# Patient Record
Sex: FEMALE | Race: WHITE | NOT HISPANIC OR LATINO | URBAN - METROPOLITAN AREA
[De-identification: names, ages, dates, MRNs, and addresses within clinical notes are randomized per-mention and may not be internally consistent; named-entity substitution may affect disease eponyms.]

---

## 2019-11-25 ENCOUNTER — INPATIENT (INPATIENT)
Facility: HOSPITAL | Age: 19
LOS: 0 days | Discharge: HOME | End: 2019-11-25
Attending: PEDIATRICS | Admitting: PEDIATRICS
Payer: COMMERCIAL

## 2019-11-25 ENCOUNTER — TRANSCRIPTION ENCOUNTER (OUTPATIENT)
Age: 19
End: 2019-11-25

## 2019-11-25 VITALS
OXYGEN SATURATION: 98 % | SYSTOLIC BLOOD PRESSURE: 133 MMHG | TEMPERATURE: 99 F | RESPIRATION RATE: 19 BRPM | HEART RATE: 113 BPM | WEIGHT: 216.05 LBS | DIASTOLIC BLOOD PRESSURE: 77 MMHG

## 2019-11-25 VITALS
RESPIRATION RATE: 20 BRPM | TEMPERATURE: 97 F | SYSTOLIC BLOOD PRESSURE: 116 MMHG | HEART RATE: 119 BPM | OXYGEN SATURATION: 99 % | DIASTOLIC BLOOD PRESSURE: 69 MMHG

## 2019-11-25 DIAGNOSIS — Z98.890 OTHER SPECIFIED POSTPROCEDURAL STATES: Chronic | ICD-10-CM

## 2019-11-25 LAB
ALBUMIN SERPL ELPH-MCNC: 4.1 G/DL — SIGNIFICANT CHANGE UP (ref 3.5–5.2)
ALP SERPL-CCNC: 78 U/L — SIGNIFICANT CHANGE UP (ref 30–115)
ALT FLD-CCNC: 10 U/L — LOW (ref 14–37)
ANION GAP SERPL CALC-SCNC: 17 MMOL/L — HIGH (ref 7–14)
AST SERPL-CCNC: 12 U/L — LOW (ref 14–37)
BILIRUB SERPL-MCNC: 0.3 MG/DL — SIGNIFICANT CHANGE UP (ref 0.2–1.2)
BUN SERPL-MCNC: 10 MG/DL — SIGNIFICANT CHANGE UP (ref 10–20)
CALCIUM SERPL-MCNC: 9.1 MG/DL — SIGNIFICANT CHANGE UP (ref 8.5–10.1)
CHLORIDE SERPL-SCNC: 99 MMOL/L — SIGNIFICANT CHANGE UP (ref 98–110)
CO2 SERPL-SCNC: 23 MMOL/L — SIGNIFICANT CHANGE UP (ref 17–32)
CREAT SERPL-MCNC: 0.6 MG/DL — LOW (ref 0.7–1.5)
GLUCOSE SERPL-MCNC: 101 MG/DL — HIGH (ref 70–99)
HCT VFR BLD CALC: 38 % — SIGNIFICANT CHANGE UP (ref 37–47)
HGB BLD-MCNC: 11.7 G/DL — LOW (ref 12–16)
MCHC RBC-ENTMCNC: 26.1 PG — LOW (ref 27–31)
MCHC RBC-ENTMCNC: 30.8 G/DL — LOW (ref 32–37)
MCV RBC AUTO: 84.8 FL — SIGNIFICANT CHANGE UP (ref 81–99)
NRBC # BLD: 0 /100 WBCS — SIGNIFICANT CHANGE UP (ref 0–0)
PLATELET # BLD AUTO: 323 K/UL — SIGNIFICANT CHANGE UP (ref 130–400)
POTASSIUM SERPL-MCNC: 4.2 MMOL/L — SIGNIFICANT CHANGE UP (ref 3.5–5)
POTASSIUM SERPL-SCNC: 4.2 MMOL/L — SIGNIFICANT CHANGE UP (ref 3.5–5)
PROT SERPL-MCNC: 7.9 G/DL — SIGNIFICANT CHANGE UP (ref 6–8)
RBC # BLD: 4.48 M/UL — SIGNIFICANT CHANGE UP (ref 4.2–5.4)
RBC # FLD: 17.9 % — HIGH (ref 11.5–14.5)
SODIUM SERPL-SCNC: 139 MMOL/L — SIGNIFICANT CHANGE UP (ref 135–146)
WBC # BLD: 17.52 K/UL — HIGH (ref 4.8–10.8)
WBC # FLD AUTO: 17.52 K/UL — HIGH (ref 4.8–10.8)

## 2019-11-25 PROCEDURE — 99285 EMERGENCY DEPT VISIT HI MDM: CPT

## 2019-11-25 PROCEDURE — 99222 1ST HOSP IP/OBS MODERATE 55: CPT

## 2019-11-25 RX ORDER — CEFDINIR 250 MG/5ML
300 POWDER, FOR SUSPENSION ORAL
Refills: 0 | Status: DISCONTINUED | OUTPATIENT
Start: 2019-11-25 | End: 2019-11-25

## 2019-11-25 RX ORDER — DIPHENHYDRAMINE HCL 50 MG
25 CAPSULE ORAL EVERY 4 HOURS
Refills: 0 | Status: DISCONTINUED | OUTPATIENT
Start: 2019-11-25 | End: 2019-11-25

## 2019-11-25 RX ORDER — CEFDINIR 250 MG/5ML
6 POWDER, FOR SUSPENSION ORAL
Qty: 120 | Refills: 0
Start: 2019-11-25 | End: 2019-12-04

## 2019-11-25 RX ORDER — DIPHENHYDRAMINE HCL 50 MG
50 CAPSULE ORAL ONCE
Refills: 0 | Status: COMPLETED | OUTPATIENT
Start: 2019-11-25 | End: 2019-11-25

## 2019-11-25 RX ORDER — CEFTRIAXONE 500 MG/1
2000 INJECTION, POWDER, FOR SOLUTION INTRAMUSCULAR; INTRAVENOUS EVERY 24 HOURS
Refills: 0 | Status: DISCONTINUED | OUTPATIENT
Start: 2019-11-25 | End: 2019-11-25

## 2019-11-25 RX ORDER — DIPHENHYDRAMINE HCL 50 MG
20 CAPSULE ORAL
Qty: 0 | Refills: 0 | DISCHARGE
Start: 2019-11-25

## 2019-11-25 RX ORDER — CEFDINIR 250 MG/5ML
1 POWDER, FOR SUSPENSION ORAL
Qty: 20 | Refills: 0
Start: 2019-11-25 | End: 2019-12-04

## 2019-11-25 RX ORDER — SODIUM CHLORIDE 9 MG/ML
1000 INJECTION, SOLUTION INTRAVENOUS
Refills: 0 | Status: DISCONTINUED | OUTPATIENT
Start: 2019-11-25 | End: 2019-11-25

## 2019-11-25 RX ORDER — DIPHENHYDRAMINE HCL 50 MG
50 CAPSULE ORAL EVERY 6 HOURS
Refills: 0 | Status: DISCONTINUED | OUTPATIENT
Start: 2019-11-25 | End: 2019-11-25

## 2019-11-25 RX ADMIN — CEFDINIR 300 MILLIGRAM(S): 250 POWDER, FOR SUSPENSION ORAL at 19:13

## 2019-11-25 RX ADMIN — Medication 100 MILLIGRAM(S): at 05:42

## 2019-11-25 RX ADMIN — Medication 30 MILLIGRAM(S): at 04:45

## 2019-11-25 RX ADMIN — SODIUM CHLORIDE 100 MILLILITER(S): 9 INJECTION, SOLUTION INTRAVENOUS at 05:42

## 2019-11-25 RX ADMIN — Medication 25 MILLIGRAM(S): at 09:41

## 2019-11-25 RX ADMIN — Medication 50 MILLIGRAM(S): at 15:22

## 2019-11-25 RX ADMIN — Medication 450 MILLIGRAM(S): at 18:04

## 2019-11-25 NOTE — ED PROVIDER NOTE - NS ED ROS FT
Constitutional:  see HPI  Head:  no change in behavior or LOC  Eyes:  no eye redness, or discharge  ENMT:  no mouth or throat sores or lesions, not tugging at ears  Cardiac: no cyanosis  Respiratory: no cough, wheezing, or trouble breathing  GI: no vomiting or diarrhea or stool color change  :  no change in urine output  MS: no joint swelling or redness  Neuro:  no seizure, no change in movements of arms and legs  Skin:  see hpi

## 2019-11-25 NOTE — ED PEDIATRIC TRIAGE NOTE - CHIEF COMPLAINT QUOTE
c/o redness and swelling to right eye x 2 day. As per mom, pt has some redness under right eye and started on clindamycin and erythromycin by pediatrician. Today is Day 10 of ABT. denies fevers.

## 2019-11-25 NOTE — CONSULT NOTE ADULT - ASSESSMENT
1. Patient with RUL/RLL edema and erythema, appears c/w preseptal cellulitis   - VA reduced OD/OS (pt given IV benadryl prior to exam), pupils normal, motilities intact, optic nerve head distinct OU, pt afebrile   - Pt recently completed course of PO Clindamycin, IV Clindamycin attempted in ED however IV infiltrated and did not receive full dose, appears to be improving per father   - Recommend course of oral antibiotics, would recommend antibiotic with more gram positive coverage vs clindamycin (eg Bactrim)  - We will follow-up next week       Thank you for the opportunity to consult. Please re-consult as necessary.

## 2019-11-25 NOTE — H&P PEDIATRIC - NSHPLABSRESULTS_GEN_ALL_CORE
CBC Full  -  ( 25 Nov 2019 04:00 )  WBC Count : 17.52 K/uL  RBC Count : 4.48 M/uL  Hemoglobin : 11.7 g/dL  Hematocrit : 38.0 %  Platelet Count - Automated : 323 K/uL  Mean Cell Volume : 84.8 fL  Mean Cell Hemoglobin : 26.1 pg  Mean Cell Hemoglobin Concentration : 30.8 g/dL  Auto Neutrophil # : x  Auto Lymphocyte # : x  Auto Monocyte # : x  Auto Eosinophil # : x  Auto Basophil # : x  Auto Neutrophil % : x  Auto Lymphocyte % : x  Auto Monocyte % : x  Auto Eosinophil % : x  Auto Basophil % : x    CMP pending

## 2019-11-25 NOTE — CONSULT NOTE ADULT - SUBJECTIVE AND OBJECTIVE BOX
ENA CRAFT  [id number]  19y Female      Patient is a 19y old  Female who presents with a chief complaint of Cellulitis (2019 16:02). Ophthalmology consulted for right eye swelling and erythema despite outpatient antiobiotics. Father reports stye which started 10 days ago, was given PO Clindamycin by pediatrician Dr. Hodges with improvement in stye. Last night, erythema and edema recurred, worse than initial stye. Patient administered IV clindamycin but IV infiltrated prior to her receiving full dose. Father reports improvement in appearance of lids since this AM. Patient denies diplopia, reports pain only on palpation. Patient afebrile.    HEALTH ISSUES - R/O PROBLEM Dx:    HPI:  19yF with history of trisomy 21 and hidradenitis suppurativa presents with right eye swelling despite outpatient antibiotics admitted for IV antibiotics for cellulitis with failed outpatient treatment.     Symptoms began as a stye of the right eye, and PMD prescribed clindamycin and erythromycin topical, which resolved symptoms. Clindamycin finished the day prior to admission, and after completion of antibiotics, parents noted increasing swelling of the right eyelid and increasing erythema of the surrounding area. Parents state that child is constantly scratching at the area and believe this is worsening her symptoms. This has happened once before to the right eye, necessitating IM injections outpatient at PMD office. There is mild diarrhea from the antibiotics, however no fever, vomiting, nausea, change in PO or UOP.       PMD: Carroll  PMH: Trisomy 21, hidradenitis supporativa  PSH: TNA  Allergies: amoxicillin- rash  Medications: topical mupirocin PRN for hidradenitis   FMH: none  Birth: FT, , no NICU  Development: receives speech, PT, OT at day program, developmentally delayed  Immunizations: UTD no flu  Social: lives at home with parents. 1 dog, no smokers    ED course: CBC, CMP, IV placed and given 1x benadryl (2019 04:12)      Extended HPI:  (-)burning, itching, redness, tearing OD/OS  (-)flashes, floaters, eye pain OD/OS    PAST MEDICAL & SURGICAL HISTORY:  Hidradenitis suppurativa  Down syndrome  History of tonsillectomy and adenoidectomy    MEDICATIONS  (STANDING):  cefTRIAXone IV Intermittent - Peds 2000 milliGRAM(s) IV Intermittent every 24 hours  clindamycin IV Intermittent - Peds 900 milliGRAM(s) IV Intermittent every 8 hours  dextrose 5% + sodium chloride 0.9%. - Pediatric 1000 milliLiter(s) (100 mL/Hr) IV Continuous <Continuous>    MEDICATIONS  (PRN):  diphenhydrAMINE   Oral Liquid - Peds 50 milliGRAM(s) Oral every 6 hours PRN Itching    Allergies    amoxicillin (Rash)    Intolerances        POHx:  CARINA: 2019   (-)injuries/surgeries    Ocular Medications: none     FOHx: Non-contributory    VISUAL ACUITY (poor cooperation, pt given IV Benadryl prior to exam)  OD: 20/70 sc PHNI   OS 20/80 sc PHNI     NEURO TESTING  Pupils: 	PERRL, (-) APD OD/OS  EOMS: 	FULL OD/OS  CVF: 	Full to red light OD/OS    ANTERIOR SEGMENT EVALUATION:   lids/lashes:  OD: moderate periorbital edema and erythema; OS: clear   conjunctiva: 	clear OD/OS  cornea: 	clear OD/OS  anterior chamber: deep & quiet OD/OS  iris: 	flat and even OD/OS    INTRAOCULAR PRESSURE  Digital: soft and equal OU   @ 11:30am     POSTERIOR SEGMENT EVALUATION  Dilated: Tropicamide 1%, fleeting views, poor cooperation   Lens: 		clear OD/OS  Vitreous: 	clear OD/OS  Optic nerve:	distinct, pink and healthy OD/OS: ONH drusen OD/OS  Macula: 	flat, even OD/OS  Vessels: 	2:3 OD/OS  Periphery: 	flat, (-)holes/breaks/tears 360 OD/OS to extent seen

## 2019-11-25 NOTE — H&P PEDIATRIC - ATTENDING COMMENTS
19 year old trisomy 21 admitted with right side eye swelling, erythema and pain secondary to preseptal (more likely) versus orbital cellulitis. Mom states that it started out as small stye and was placed on oral clindamycin and once she finished course swelling came back worse than before. Physical exam shows normal vitals, afebrile. HEENT right periorbital swelling with erythema, no cutaneous lesions visible. EOM intact and no pain on movement B/L, no ptosis. Facial features consistent with trisomy 21. Otherwise unremarkable exam. Will continue with antibiotics, ophtho exam, orbital CT if indicated and continue clinical monitoring.

## 2019-11-25 NOTE — H&P PEDIATRIC - ASSESSMENT
Emily is a 19yF with history of trisomy 21 and hidradenitis who presents with worsened eye erythema and swelling despite outpatient antibiotics, admitted for IV management of cellulitis and ophthalmology consult    Plan  Resp  -room air    FENGI  -regular diet  -D5NS @100cc/hr    ID  -Clindamycin 600mg q8  -s/p 1x benadryl to help with scratching of the area, will assess effectiveness and order if needed    Ophtho  opthalmology consult  -consider CT orbit to assess extent of cellulitis     Derm  -father to bring in topical cream for hidradenitis, will order once brings in Emily is a 19yF with history of trisomy 21 and hidradenitis who presents with worsened eye erythema and swelling despite outpatient antibiotics, admitted for IV management of cellulitis and ophthalmology consult    Plan  Resp  -room air    FENGI  -regular diet  -D5NS @100cc/hr    ID  -Clindamycin 600mg q8  -s/p 1x benadryl to help with scratching of the area, will assess effectiveness and order if needed  -warm compresses    Ophtho  -opthalmology consult  -consider CT orbit to assess extent of cellulitis     Derm  -father to bring in topical cream for hidradenitis, will order once brings in Emily is a 19yF with history of trisomy 21 and hidradenitis who presents with worsened eye erythema and swelling despite outpatient antibiotics, admitted for IV management of cellulitis and ophthalmology consult    Plan  Resp  -room air    FENGI  -regular diet  -D5NS @100cc/hr    ID  -Clindamycin 600mg q8  -Ceftriaxone   -s/p 1x benadryl to help with scratching of the area, will assess effectiveness and order if needed  -warm compresses    Ophtho  -opthalmology consult  -consider CT orbit to assess extent of cellulitis if vision is impaired as per ophtho, or no improvement with IV antibiotics     Derm  -father to bring in topical cream for hidradenitis, will order once brings in Emily is a 19yF with history of trisomy 21 and hidradenitis who presents with worsened eye erythema and swelling despite outpatient antibiotics, admitted for IV management of cellulitis and ophthalmology consult    Plan  Resp  -room air    FENGI  -regular diet  -D5NS @100cc/hr    ID  -Clindamycin 600mg q8  -Ceftriaxone 2g q24  -s/p 1x benadryl to help with scratching of the area, will assess effectiveness and order if needed  -warm compresses    Ophtho  -opthalmology consult  -consider CT orbit to assess extent of cellulitis if vision is impaired as per ophtho, or no improvement with IV antibiotics     Derm  -father to bring in topical cream for hidradenitis, will order once brings in

## 2019-11-25 NOTE — DISCHARGE NOTE PROVIDER - NSDCMRMEDTOKEN_GEN_ALL_CORE_FT
cefdinir 300 mg oral capsule: 1 cap(s) orally every 12 hours   clindamycin 150 mg oral capsule: 3 cap(s) orally every 6 hours   diphenhydrAMINE 12.5 mg/5 mL oral liquid: 20 milliliter(s) orally every 6 hours, As needed, Itching diphenhydrAMINE 12.5 mg/5 mL oral liquid: 20 milliliter(s) orally every 6 hours, As needed, Itching

## 2019-11-25 NOTE — DISCHARGE NOTE PROVIDER - CARE PROVIDER_API CALL
Praful Hodges  [address] [place] [address]  Phone: [phone]  Fax: (   )    -  Follow Up Time: 1-3 days

## 2019-11-25 NOTE — H&P PEDIATRIC - NSHPPHYSICALEXAM_GEN_ALL_CORE
General: Well developed; well nourished; in no acute distress    Eyes: Right eye: eyelid swollen. There is erythema both above and below the right eye. No drainage. PERRLA, EOM intact; conjunctiva and sclera clear  Head: Normocephalic; atraumatic;  ENMT: TM unable to visualize bilaterally due to cerumen.   Neck: Supple; non tender; No cervical adenopathy  Respiratory: No chest wall deformity, normal respiratory pattern, clear to auscultation bilaterally  Cardiovascular: Regular rate and rhythm. S1 and S2 Normal; No murmurs, gallops or rubs  Abdominal: Soft non-tender non-distended; normal bowel sounds; no hepatosplenomegaly; no masses  Extremities: Full range of motion, no tenderness, no cyanosis or edema  Vascular: Upper and lower peripheral pulses palpable 2+ bilaterally  Neurological: Alert, affect appropriate, no acute change from baseline  Skin: Multiple nodules all on the abdomen, chest, and under the breast and in axilla.   Lymph Nodes: No  adenopathy

## 2019-11-25 NOTE — H&P PEDIATRIC - NSHPSOCIALHISTORY_GEN_ALL_CORE
Lives at home with parents. 1 dog in the home, no smokers  Attends day program and receives speech, PT OT

## 2019-11-25 NOTE — DISCHARGE NOTE PROVIDER - PROVIDER TOKENS
FREE:[LAST:[Carroll],FIRST:[Praful],PHONE:[phone]],FAX:[(   )    -],ADDRESS:[[address] [place] [address]],FOLLOWUP:[1-3 days]]

## 2019-11-25 NOTE — H&P PEDIATRIC - HISTORY OF PRESENT ILLNESS
19yF with history of trisomy 21 and hidradenitis suppurativa presents with right eye swelling despite outpatient antibiotics admitted for IV antibiotics for cellulitis with failed outpatient treatment.     Symptoms began as a stye of the right eye, and PMD prescribed clindamycin and erythromycin topical, which resolved symptoms. Clindamycin finished the day prior to admission, and after completion of antibiotics, parents noted increasing swelling of the right eyelid and increasing erythema of the surrounding area. Parents state that child is constantly scratching at the area and believe this is worsening her symptoms. This has happened once before to the right eye, necessitating IM injections outpatient at PMD office. There is mild diarrhea from the antibiotics, however no fever, vomiting, nausea, change in PO or UOP.       PMD: Carroll  PMH: Trisomy 21, hidradenitis supporativa  PSH: none  Allergies: amoxicillin- rash  Medications: topical mupirocin PRN for hidradenitis   FMH: none  Birth: FT, , no NICU  Development: receives speech, PT, OT at day program, developmentally delayed  Immunizations: UTD no flu  Social: lives at home with parents. 1 dog, no smokers    ED course: CBC, CMP, IV placed and given 1x benadryl 19yF with history of trisomy 21 and hidradenitis suppurativa presents with right eye swelling despite outpatient antibiotics admitted for IV antibiotics for cellulitis with failed outpatient treatment.     Symptoms began as a stye of the right eye, and PMD prescribed clindamycin and erythromycin topical, which resolved symptoms. Clindamycin finished the day prior to admission, and after completion of antibiotics, parents noted increasing swelling of the right eyelid and increasing erythema of the surrounding area. Parents state that child is constantly scratching at the area and believe this is worsening her symptoms. This has happened once before to the right eye, necessitating IM injections outpatient at PMD office. There is mild diarrhea from the antibiotics, however no fever, vomiting, nausea, change in PO or UOP.       PMD: Carroll  PMH: Trisomy 21, hidradenitis supporativa  PSH: TNA  Allergies: amoxicillin- rash  Medications: topical mupirocin PRN for hidradenitis   FMH: none  Birth: FT, , no NICU  Development: receives speech, PT, OT at day program, developmentally delayed  Immunizations: UTD no flu  Social: lives at home with parents. 1 dog, no smokers    ED course: CBC, CMP, IV placed and given 1x benadryl

## 2019-11-25 NOTE — DISCHARGE NOTE PROVIDER - HOSPITAL COURSE
19 YOF with history of trisomy 21 and hidradenitis who presents with worsened eye erythema and swelling despite outpatient antibiotics, admitted for IV management of cellulitis and ophthalmology consult.          ED Course: CBC, CMP, 1x Benadryl        Hospital Course (11/25)        Patient was originally on IV antibiotics, but her IV infiltrated before she was able to be given her dose.  Opthalmology consulted and took a look at her, and said that they were not concerned about septal orbital cellulitis.  Patient was started on oral clindamycin and omnicef, and was given her first dose of 10 days on the floor.          Patient remained stable while admitted.  Inferior to her right eye was swelling and erythema.  It irritated the patient who kept scratching at it, so patient was given benadryl PRN.  Her vitals remained stable, and she was afebrile, her entire hospital course.          Patient was discharged with 10 days of clindamycin and omnicef.  She will follow up with Dr. Hodges within 2-3 days.                                 11.7     17.52 )-----------( 323      ( 25 Nov 2019 04:00 )               38.0         11-25        139  |  99  |  10    ----------------------------<  101<H>    4.2   |  23  |  0.6<L>        Ca    9.1      25 Nov 2019 04:00        TPro  7.9  /  Alb  4.1  /  TBili  0.3  /  DBili  x   /  AST  12<L>  /  ALT  10<L>  /  AlkPhos  78  11-25

## 2019-11-25 NOTE — H&P PEDIATRIC - NSICDXPASTSURGICALHX_GEN_ALL_CORE_FT
No significant past surgical history PAST SURGICAL HISTORY:  History of tonsillectomy and adenoidectomy

## 2019-11-25 NOTE — DISCHARGE NOTE PROVIDER - NSDCCPCAREPLAN_GEN_ALL_CORE_FT
PRINCIPAL DISCHARGE DIAGNOSIS  Diagnosis: Cellulitis  Assessment and Plan of Treatment: Take your clindamycin and omnicef for 10 days.  Follow up with your PMD within 2-3 days of discharge

## 2019-11-25 NOTE — ED PROVIDER NOTE - ATTENDING CONTRIBUTION TO CARE
19yr hx of right eye redness for the past 10 days patient has been taking erythromycin oint and clindamycin for 10 days today parents noticed that redness and swelling is getting worse no fever no other issues  VS reviewed, stable.  Gen: interactive, well appearing, no acute distress  HEENT: NC/AT,  right TM  non bulging  left tm,  no evidence of mastoiditis,  moist mucus membranes, pupils equal, responsive, reactive to light and accomodation, no conjunctivitis or scleral icterus; no nasal discharge .right eyelid    OP no exudates no erythema  Neck: FROM, supple, no cervical LAD  Chest: CTA b/l, no crackles/wheezes, good air entry, no tachypnea or retractions  CV: regular rate and rhythm, no murmurs   Abd: soft, nontender, nondistended, no HSM appreciated, +BS  plan will admit for benadryl IV and IV clindamycin with AM optho consult 19yr with down syndrome  hx of right eye redness for the past 10 days patient has been taking erythromycin oint and clindamycin for 10 days today parents noticed that redness and swelling is getting worse no fever no other issues  VS reviewed, stable.  Gen: interactive, well appearing, no acute distress  HEENT: NC/AT,  right TM  non bulging  left tm,  no evidence of mastoiditis,  moist mucus membranes, pupils equal, responsive, reactive to light and accomodation, no conjunctivitis or scleral icterus; no nasal discharge .right eyelid mildely erytheamtous with erythema infraorbital region of the eye no pain on eye movement patient is scratching area a lot    OP no exudates no erythema  Neck: FROM, supple, no cervical LAD  Chest: CTA b/l, no crackles/wheezes, good air entry, no tachypnea or retractions  CV: regular rate and rhythm, no murmurs   Abd: soft, nontender, nondistended, no HSM appreciated, +BS  plan will admit for benadryl IV and IV clindamycin with AM optho consult

## 2019-11-30 DIAGNOSIS — L03.213 PERIORBITAL CELLULITIS: ICD-10-CM

## 2019-11-30 DIAGNOSIS — Q90.9 DOWN SYNDROME, UNSPECIFIED: ICD-10-CM

## 2019-11-30 DIAGNOSIS — Z88.1 ALLERGY STATUS TO OTHER ANTIBIOTIC AGENTS STATUS: ICD-10-CM

## 2020-10-01 PROBLEM — L73.2 HIDRADENITIS SUPPURATIVA: Chronic | Status: ACTIVE | Noted: 2019-11-25

## 2020-10-01 PROBLEM — Q90.9 DOWN SYNDROME, UNSPECIFIED: Chronic | Status: ACTIVE | Noted: 2019-11-25

## 2020-11-05 ENCOUNTER — APPOINTMENT (OUTPATIENT)
Dept: PLASTIC SURGERY | Facility: CLINIC | Age: 20
End: 2020-11-05
Payer: MEDICARE

## 2020-11-05 DIAGNOSIS — L73.2 HIDRADENITIS SUPPURATIVA: ICD-10-CM

## 2020-11-05 PROCEDURE — 99072 ADDL SUPL MATRL&STAF TM PHE: CPT

## 2020-11-05 PROCEDURE — 99203 OFFICE O/P NEW LOW 30 MIN: CPT

## 2020-11-05 RX ORDER — CLINDAMYCIN PHOSPHATE 10 MG/ML
1 LOTION TOPICAL TWICE DAILY
Qty: 1 | Refills: 0 | Status: ACTIVE | COMMUNITY
Start: 2020-11-05 | End: 1900-01-01

## 2020-11-05 NOTE — ASSESSMENT
[FreeTextEntry1] : Discussed hidradenitis and management\par Discussed my role as a reconstructive surgeon as part of team\par Pt will see Dr Ward of [facility] for further management\par \par All ?s answered

## 2020-11-05 NOTE — PHYSICAL EXAM
[NI] : Normal [de-identified] : Down's syndorme facies/body habitus [de-identified] : minor left cheek cystic changes [de-identified] : scatterered eccrine glandular inflammation c/w hidradenitis--affected abdomen, bilateral breast / droin / B/L axillae\par single area left flank slighly inflamed approx 1.5cm diameter

## 2020-11-05 NOTE — HISTORY OF PRESENT ILLNESS
[FreeTextEntry1] : Pt 20 yr old patient w Down's syndrome seen w her mother for eval of hidradenitis supp\par \par otherwise fairly healthy, in a program for Down's (not currently meeting due to Covid)

## 2021-07-13 NOTE — DISCHARGE NOTE NURSING/CASE MANAGEMENT/SOCIAL WORK - PATIENT PORTAL LINK FT
You can access the FollowMyHealth Patient Portal offered by [facility] by registering at the following website: [email or web address]. By joining [employer]’s FollowMyHealth portal, you will also be able to view your health information using other applications (apps) compatible with our system. Vital Signs Last 24 Hrs  T(C): 35.9 (13 Jul 2021 06:20), Max: 37.2 (12 Jul 2021 16:00)  T(F): 96.7 (13 Jul 2021 06:20), Max: 99 (12 Jul 2021 16:00)  HR: --  BP: --  BP(mean): --  RR: --  SpO2: -- Vital Signs Last 24 Hrs  T(C): 35.9 (13 Jul 2021 06:20), Max: 37.2 (12 Jul 2021 16:00)  T(F): 96.7 (13 Jul 2021 06:20), Max: 99 (12 Jul 2021 16:00)    Orthostatic VS  07-13-21 @ 06:20  Lying BP: 150/72 HR: 76   Sitting BP: 152/86 HR: 78  Site: upper left arm   Mode: electronic

## 2023-06-15 ENCOUNTER — OUTPATIENT (OUTPATIENT)
Dept: OUTPATIENT SERVICES | Facility: HOSPITAL | Age: 23
LOS: 1 days | End: 2023-06-15
Payer: MEDICARE

## 2023-06-15 DIAGNOSIS — N63.10 UNSPECIFIED LUMP IN THE RIGHT BREAST, UNSPECIFIED QUADRANT: ICD-10-CM

## 2023-06-15 DIAGNOSIS — Z98.890 OTHER SPECIFIED POSTPROCEDURAL STATES: Chronic | ICD-10-CM

## 2023-06-15 PROCEDURE — 76641 ULTRASOUND BREAST COMPLETE: CPT | Mod: 26,50

## 2023-06-15 PROCEDURE — 76641 ULTRASOUND BREAST COMPLETE: CPT | Mod: 50

## 2023-06-16 DIAGNOSIS — N63.10 UNSPECIFIED LUMP IN THE RIGHT BREAST, UNSPECIFIED QUADRANT: ICD-10-CM

## 2023-12-12 ENCOUNTER — EMERGENCY (EMERGENCY)
Facility: HOSPITAL | Age: 23
LOS: 0 days | Discharge: HOME | End: 2023-12-12
Attending: EMERGENCY MEDICINE
Payer: MEDICARE

## 2023-12-12 VITALS
TEMPERATURE: 98 F | WEIGHT: 201.94 LBS | HEART RATE: 111 BPM | OXYGEN SATURATION: 99 % | RESPIRATION RATE: 22 BRPM | DIASTOLIC BLOOD PRESSURE: 89 MMHG | SYSTOLIC BLOOD PRESSURE: 146 MMHG

## 2023-12-12 DIAGNOSIS — S09.90XA UNSPECIFIED INJURY OF HEAD, INITIAL ENCOUNTER: ICD-10-CM

## 2023-12-12 DIAGNOSIS — Z98.890 OTHER SPECIFIED POSTPROCEDURAL STATES: Chronic | ICD-10-CM
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
## 2023-12-12 DIAGNOSIS — Q90.9 DOWN SYNDROME, UNSPECIFIED: ICD-10-CM

## 2023-12-12 DIAGNOSIS — S00.83XA CONTUSION OF OTHER PART OF HEAD, INITIAL ENCOUNTER: ICD-10-CM

## 2023-12-12 DIAGNOSIS — Z88.0 ALLERGY STATUS TO PENICILLIN: ICD-10-CM

## 2023-12-12 PROCEDURE — 70450 CT HEAD/BRAIN W/O DYE: CPT | Mod: 26,MA

## 2023-12-12 PROCEDURE — 70450 CT HEAD/BRAIN W/O DYE: CPT | Mod: MA

## 2023-12-12 PROCEDURE — 96372 THER/PROPH/DIAG INJ SC/IM: CPT

## 2023-12-12 PROCEDURE — 99284 EMERGENCY DEPT VISIT MOD MDM: CPT | Mod: 25

## 2023-12-12 PROCEDURE — 99285 EMERGENCY DEPT VISIT HI MDM: CPT

## 2023-12-12 RX ORDER — DIAZEPAM 5 MG
5 TABLET ORAL ONCE
Refills: 0 | Status: DISCONTINUED | OUTPATIENT
Start: 2023-12-12 | End: 2023-12-12

## 2023-12-12 RX ADMIN — Medication 5 MILLIGRAM(S): at 11:25

## 2023-12-12 NOTE — ED PROVIDER NOTE - CLINICAL SUMMARY MEDICAL DECISION MAKING FREE TEXT BOX
23-year-old female with past medical history of trisomy 21 presents with head trauma.  Father states that he was making her breakfast and then heard her fall down the stairs.  Immediately went there and patient was awake.  No LOC.  Thinks she may have fallen down 10-12 steps.  Patient not complaining of any pain anywhere.  Has been acting normal since.  No vomiting.  They only found injury to the head.  On exam nontoxic, vital signs noted, frontal hematoma without laceration.  No nasal septal hematoma or tenderness or instability to the facial bones.  C-spine nontender no step-offs, remainder spine nontender no step-offs.   lungs clear bilaterally, heart regular no murmurs, abdomen benign, pelvis stable, all 4 extremities palpated and no tenderness or deformities and full range of motion and neurovascular intact distally.  Given developmentally delayed obtain CT head but somewhat limited by motion artifact.  No signs of basilar skull fracture on exam and observed for several hours post fall and at baseline during each assessment per the family.  In my opinion, based on current evaluation and results, an acute medical or surgical emergency does not appear to be occurring at this time and I feel that the pt is stable for further outpatient work up and/or treatment.  Return precautions discussed.

## 2023-12-12 NOTE — ED PROVIDER NOTE - PHYSICAL EXAMINATION
CONSTITUTIONAL:  in no acute distress.   SKIN: warm, dry  HEAD: Normocephalic; right frontal headache  EYES: PERRL, EOMI, normal sclera and conjunctiva   ENT: No nasal discharge; airway clear.  NECK: Supple; non tender.  CARD:  Regular rate and rhythm.   RESP: NO inc WOB   ABD: soft ntnd  EXT: Normal ROM.    LYMPH: No acute cervical adenopathy.  NEURO: Alert, oriented, grossly unremarkable  PSYCH: Cooperative, appropriate.

## 2023-12-12 NOTE — ED PROVIDER NOTE - NSFOLLOWUPINSTRUCTIONS_ED_ALL_ED_FT
Fall Prevention for Children    WHAT YOU NEED TO KNOW:    Fall prevention includes ways to make your home and other areas safer. It also includes ways you can help your child move more carefully to prevent a fall.    DISCHARGE INSTRUCTIONS:    Call 911 for any of the following:     Your child has fallen and is unconscious.      Your child has fallen and cannot move a part of his or her body.    Contact your child's healthcare provider if:     Your child has fallen and has pain or a headache.      You have questions or concerns about your child's condition or care.    The following increase your child's risk for a fall:     Being left alone on a changing table, bed, or sofa (infants and toddlers)      Going up or down stairs, or using a baby walker around the house      Furniture that is not secured to the wall      Windows that are not locked or covered with a safety screen device      Riding in a shopping cart without being secured with a safety belt      Not playing safely on playground equipment    Help your child prevent falls:     Use safety roger at the top and bottom of stairs for young children. Make sure the roger fit tightly. Keep the roger closed and locked at all times.      Secure windows. Place locks on the windows that are not emergency exits. Window locks prevent the window from opening more than 4 inches. Place window guards on windows that are above the first floor. If you keep a window open during the summer months, make sure your child cannot reach the window. A screen will not stop your child from falling out a window.       Add items to prevent falls in the bathroom. Put nonslip strips on your bath or shower floor to prevent your child from slipping. Use a bath mat if you do not have carpet in the bathroom. This will prevent your child from falling when he or she steps out of the bath or shower. Have your child sit on the toilet or a chair in the bathroom while drying off and putting on clothing. This will prevent your child from losing his or her balance while standing.       Keep paths clear. Remove books, shoes, and other objects from walkways and stairs. Place cords for telephones and lamps out of the way so that your child does not need to walk over them. Tape them down if you cannot move them. Remove small rugs. If you cannot remove a rug, secure it with double-sided tape. This will prevent your child from tripping.       Install bright lights in your home. Use night lights to help light paths to the bathroom or kitchen. Teach your child to turn on the light before he or she starts walking.      Do not allow your child to climb on furniture. This includes bookshelves, dressers, and kitchen counters and cabinets. If your child sleeps in a bunk bed, make sure he or she uses the ladder correctly to go up and down. Use guard rails to prevent your child from falling from the top bed.      Do not leave your child alone on or in furniture. Use safety belts on changing tables and put crib guardrails up while your infant is in the crib. Move cribs and other furniture away from windows to prevent children from climbing on them to reach the window.      Do not use baby walkers on wheels. Use an activity center that is like a baby walker but does not have wheels. These allow children to bounce and rotate around while they stay in place.       Do not let your child play on unsafe playgrounds or play sets. A playground is not safe if it has asphalt, concrete, grass, or hard soil under the equipment. Choose a playground that is the appropriate for your child's age. Use shredded rubber, wood chips, mulch, or sand underneath your play set at home. These materials should be at least 9 inches deep and extend 6 feet around the equipment. Watch your child at all times.     If your child has a disability: Your child's risk for falls is higher if he or she has a medical condition that decreases movement. Your child can fall while he or she is being moved or the position is being changed. If your child is in a wheelchair, he or she can fall from or tip over the wheelchair. Wheelchairs that are not adjusted well or have a knapsack on the back can also cause falls. Support for wheelchair seats such as seat belts, seat angles, and custom molding may stop wheelchairs from tipping. Check your child’s wheelchair or other equipment to make sure they are safe to use.     Follow up with your child's healthcare provider as directed: Write down your questions so you remember to ask them during your child's visits.

## 2023-12-12 NOTE — ED PROVIDER NOTE - PATIENT PORTAL LINK FT
You can access the FollowMyHealth Patient Portal offered by [facility] by registering at the following website: [email or web address]. By joining [facility]’s FollowMyHealth portal, you will also be able to view your health information using other applications (apps) compatible with our system. You can access the FollowMyHealth Patient Portal offered by [facility] by registering at the following website: [email or web address]. By joining [facility]’s FollowMyHealth portal, you will also be able to view your health information using other applications (apps) compatible with our system.

## 2023-12-12 NOTE — ED ADULT TRIAGE NOTE - CHIEF COMPLAINT QUOTE
pt biba for fall down approx 6 steps injuring forehead/and nose. pt denies loc/anit-coagulants. pt has no signs of concussion. pt is ao*4 in triage

## 2023-12-12 NOTE — ED ADULT NURSE NOTE - OBJECTIVE STATEMENT
Patient presented to ED c/o fall down 6 steps. Pt injured forehead and nose. Denies LOC, bloodthinners, pain

## 2023-12-12 NOTE — ED ADULT NURSE NOTE - NSFALLRISKINTERV_ED_ALL_ED
Communicate fall risk and risk factors to all staff, patient, and family/Provide visual cue: yellow wristband, yellow gown, etc/Reinforce activity limits and safety measures with patient and family/Call bell, personal items and telephone in reach/Instruct patient to call for assistance before getting out of bed/chair/stretcher/Non-slip footwear applied when patient is off stretcher/[place] to call system/Physically safe environment - no spills, clutter or unnecessary equipment/Purposeful Proactive Rounding/Room/bathroom lighting operational, light cord in reach Communicate fall risk and risk factors to all staff, patient, and family/Provide visual cue: yellow wristband, yellow gown, etc/Reinforce activity limits and safety measures with patient and family/Call bell, personal items and telephone in reach/Instruct patient to call for assistance before getting out of bed/chair/stretcher/Non-slip footwear applied when patient is off stretcher/[place] to call system/Physically safe environment - no spills, clutter or unnecessary equipment/Purposeful Proactive Rounding/Room/bathroom lighting operational, light cord in reach Assistance with ambulation/Communicate fall risk and risk factors to all staff, patient, and family/Monitor gait and stability/Monitor for mental status changes and reorient to person, place, and time, as needed/Provide visual cue: yellow wristband, yellow gown, etc/Reinforce activity limits and safety measures with patient and family/Call bell, personal items and telephone in reach/Instruct patient to call for assistance before getting out of bed/chair/stretcher/Non-slip footwear applied when patient is off stretcher/[place] to call system/Physically safe environment - no spills, clutter or unnecessary equipment/Purposeful Proactive Rounding/Room/bathroom lighting operational, light cord in reach Assistance with ambulation/Communicate fall risk and risk factors to all staff, patient, and family/Monitor gait and stability/Monitor for mental status changes and reorient to person, place, and time, as needed/Provide visual cue: yellow wristband, yellow gown, etc/Reinforce activity limits and safety measures with patient and family/Call bell, personal items and telephone in reach/Instruct patient to call for assistance before getting out of bed/chair/stretcher/Non-slip footwear applied when patient is off stretcher/[place] to call system/Physically safe environment - no spills, clutter or unnecessary equipment/Purposeful Proactive Rounding/Room/bathroom lighting operational, light cord in reach

## 2024-02-06 ENCOUNTER — OUTPATIENT (OUTPATIENT)
Dept: OUTPATIENT SERVICES | Facility: HOSPITAL | Age: 24
LOS: 1 days | End: 2024-02-06
Payer: MEDICARE

## 2024-02-06 ENCOUNTER — APPOINTMENT (OUTPATIENT)
Dept: OPHTHALMOLOGY | Facility: CLINIC | Age: 24
End: 2024-02-06
Payer: MEDICARE

## 2024-02-06 DIAGNOSIS — H53.8 OTHER VISUAL DISTURBANCES: ICD-10-CM

## 2024-02-06 DIAGNOSIS — Z98.890 OTHER SPECIFIED POSTPROCEDURAL STATES: Chronic | ICD-10-CM

## 2024-02-06 PROCEDURE — 92004 COMPRE OPH EXAM NEW PT 1/>: CPT

## 2024-02-13 DIAGNOSIS — H01.003 UNSPECIFIED BLEPHARITIS RIGHT EYE, UNSPECIFIED EYELID: ICD-10-CM

## 2024-02-13 DIAGNOSIS — H25.10 AGE-RELATED NUCLEAR CATARACT, UNSPECIFIED EYE: ICD-10-CM
